# Patient Record
Sex: FEMALE | Race: WHITE | Employment: UNEMPLOYED | URBAN - METROPOLITAN AREA
[De-identification: names, ages, dates, MRNs, and addresses within clinical notes are randomized per-mention and may not be internally consistent; named-entity substitution may affect disease eponyms.]

---

## 2017-08-05 ENCOUNTER — ANESTHESIA EVENT (OUTPATIENT)
Dept: ENDOSCOPY | Age: 57
End: 2017-08-05
Payer: COMMERCIAL

## 2017-08-05 RX ORDER — SODIUM CHLORIDE, SODIUM LACTATE, POTASSIUM CHLORIDE, CALCIUM CHLORIDE 600; 310; 30; 20 MG/100ML; MG/100ML; MG/100ML; MG/100ML
100 INJECTION, SOLUTION INTRAVENOUS CONTINUOUS
Status: CANCELLED | OUTPATIENT
Start: 2017-08-05

## 2017-08-05 RX ORDER — SODIUM CHLORIDE 0.9 % (FLUSH) 0.9 %
5-10 SYRINGE (ML) INJECTION AS NEEDED
Status: CANCELLED | OUTPATIENT
Start: 2017-08-05

## 2017-08-07 ENCOUNTER — HOSPITAL ENCOUNTER (OUTPATIENT)
Age: 57
Setting detail: OUTPATIENT SURGERY
Discharge: HOME | End: 2017-08-07
Attending: INTERNAL MEDICINE | Admitting: INTERNAL MEDICINE
Payer: COMMERCIAL

## 2017-08-07 ENCOUNTER — ANESTHESIA (OUTPATIENT)
Dept: ENDOSCOPY | Age: 57
End: 2017-08-07
Payer: COMMERCIAL

## 2017-08-07 VITALS
HEIGHT: 67 IN | DIASTOLIC BLOOD PRESSURE: 77 MMHG | BODY MASS INDEX: 45.99 KG/M2 | OXYGEN SATURATION: 99 % | RESPIRATION RATE: 16 BRPM | SYSTOLIC BLOOD PRESSURE: 174 MMHG | WEIGHT: 293 LBS | HEART RATE: 59 BPM | TEMPERATURE: 98.6 F

## 2017-08-07 PROCEDURE — 88305 TISSUE EXAM BY PATHOLOGIST: CPT | Performed by: INTERNAL MEDICINE

## 2017-08-07 PROCEDURE — 77030013991 HC SNR POLYP ENDOSC BSC -A: Performed by: INTERNAL MEDICINE

## 2017-08-07 PROCEDURE — 76060000032 HC ANESTHESIA 0.5 TO 1 HR: Performed by: INTERNAL MEDICINE

## 2017-08-07 PROCEDURE — 74011250636 HC RX REV CODE- 250/636: Performed by: ANESTHESIOLOGY

## 2017-08-07 PROCEDURE — 74011000250 HC RX REV CODE- 250

## 2017-08-07 PROCEDURE — 76040000025: Performed by: INTERNAL MEDICINE

## 2017-08-07 PROCEDURE — 74011250636 HC RX REV CODE- 250/636

## 2017-08-07 PROCEDURE — 77030009426 HC FCPS BIOP ENDOSC BSC -B: Performed by: INTERNAL MEDICINE

## 2017-08-07 PROCEDURE — 77030011640 HC PAD GRND REM COVD -A: Performed by: INTERNAL MEDICINE

## 2017-08-07 RX ORDER — PROPOFOL 10 MG/ML
INJECTION, EMULSION INTRAVENOUS AS NEEDED
Status: DISCONTINUED | OUTPATIENT
Start: 2017-08-07 | End: 2017-08-07 | Stop reason: HOSPADM

## 2017-08-07 RX ORDER — LIDOCAINE HYDROCHLORIDE 20 MG/ML
INJECTION, SOLUTION EPIDURAL; INFILTRATION; INTRACAUDAL; PERINEURAL AS NEEDED
Status: DISCONTINUED | OUTPATIENT
Start: 2017-08-07 | End: 2017-08-07 | Stop reason: HOSPADM

## 2017-08-07 RX ORDER — SODIUM CHLORIDE, SODIUM LACTATE, POTASSIUM CHLORIDE, CALCIUM CHLORIDE 600; 310; 30; 20 MG/100ML; MG/100ML; MG/100ML; MG/100ML
100 INJECTION, SOLUTION INTRAVENOUS CONTINUOUS
Status: DISCONTINUED | OUTPATIENT
Start: 2017-08-07 | End: 2017-08-07 | Stop reason: HOSPADM

## 2017-08-07 RX ORDER — PROPOFOL 10 MG/ML
INJECTION, EMULSION INTRAVENOUS
Status: DISCONTINUED | OUTPATIENT
Start: 2017-08-07 | End: 2017-08-07 | Stop reason: HOSPADM

## 2017-08-07 RX ADMIN — PROPOFOL 180 MCG/KG/MIN: 10 INJECTION, EMULSION INTRAVENOUS at 08:21

## 2017-08-07 RX ADMIN — PROPOFOL 30 MG: 10 INJECTION, EMULSION INTRAVENOUS at 08:21

## 2017-08-07 RX ADMIN — SODIUM CHLORIDE, SODIUM LACTATE, POTASSIUM CHLORIDE, AND CALCIUM CHLORIDE 100 ML/HR: 600; 310; 30; 20 INJECTION, SOLUTION INTRAVENOUS at 07:28

## 2017-08-07 RX ADMIN — LIDOCAINE HYDROCHLORIDE 40 MG: 20 INJECTION, SOLUTION EPIDURAL; INFILTRATION; INTRACAUDAL; PERINEURAL at 08:21

## 2017-08-07 NOTE — DISCHARGE INSTRUCTIONS
Gastrointestinal Esophagogastroduodenoscopy (EGD) - Upper Exam Discharge Instructions    1. Call Dr. Evette Pavon at [phone] for any problems or questions. 2. Contact the doctor's office for follow up appointment as directed. 3. Medication may cause drowsiness for several hours, therefore, do not drive or operate machinery for remainder of the day. 4. No alcohol today. 5. Ordinarily, you may resume regular diet and activity after exam unless otherwise specified by your physician. 6. For mild soreness in your throat you may use Cepacol throat lozenges or warm salt-water gargles as needed. Any additional instructions:   dietary measures to reduce GERD symptoms. Recommend gradual weight loss. Continue omeprazole  Avoid NSAIDs       Instructions given to Misti Lares and other family members. Instructions given by:  Yefri Lee RN    Gastrointestinal Colonoscopy/Flexible Sigmoidoscopy - Lower Exam Discharge Instructions  1. Call Dr. Evette Pavon at [phone] for any problems or questions. 2. Contact the doctors office for follow up appointment as directed  3. Medication may cause drowsiness for several hours, therefore, do not drive or operate machinery for remainder of the day. 4. No alcohol today. 5. Ordinarily, you may resume regular diet and activity after exam unless otherwise specified by your physician. 6. Because of air put into your colon during exam, you may experience some abdominal distension, relieved by the passage of gas, for several hours. 7. Contact your physician if you have any of the following:  a. Excessive amount of bleeding - large amount when having a bowel movement. Occasional specks of blood with bowel movement would not be unusual.  b. Severe abdominal pain  c. Fever or Chills  8. Polyp Removal - follow these additional instructions  a. Resume regular diet   b. Take Metamucil - 1 tablespoon in juice every morning for 3 days  c.  No Aspirin, Advil, Aleve, Nuprin, Ibuprofen, or medications that contain these drugs for 2 weeks. Any additional instructions: Follow-up pathology results. High fiber diet for bowel regularity. Daily fiber supplement if needed. No NSAIDs for 2 weeks  Continue Colestid for diarrhea- likely related to prior cholecystectomy. Increase amount as needed  Followup in the office with Dr. Robert Escalera    Instructions given to Leopoldo Alexa and other family members.   Instructions given by:  Kang Patino RN

## 2017-08-07 NOTE — PROGRESS NOTES
Discharge instructions and discharge med list given to pt's  Todd, voiced understanding. Pt's  given print out of high fiber and GERD diets,voiced understanding.

## 2017-08-07 NOTE — PROCEDURES
Colonoscopy Procedure Note    PreOp Diagnosis:   Colon cancer screening  Diarrhea    PostOp Diagnosis:  Colon polyp  Diverticulosis    Medications:  Monitored Anesthesia    Procedure:  Colonoscopy  Instrument:   AL  After informed consent was obtained, the patient was sedated and the colonoscope was inserted  in the anus and advanced into the cecum without difficulty. The scope was slowly withdrawn while the mucosa was carefully inspected, including a retroflexed view of the rectum. Prep: Excellent    Findings:   Ileum: Normal  Colon: Moderate diverticulosis in the sigmoid and descending colon. 3 cm pedunculated polyp in the sigmoid colon, hot snare. No evidence of colitis or inflammation. Random biopsies were obtained throughout the right and left colon to rule out microscopic colitis. Rectum: Normal    Recommendations: Follow-up pathology results. High fiber diet for bowel regularity. Daily fiber supplement if needed. No NSAIDs for 2 weeks  Continue Colestid for diarrhea- likely related to prior cholecystectomy.   Increase amount as needed  Followup in the office with Dr. Becky Corcoran MD

## 2017-08-07 NOTE — ANESTHESIA PREPROCEDURE EVALUATION
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)    Exercise tolerance: >4 METS     GI/Hepatic/Renal     GERD: well controlled    Renal disease: CRI       Endo/Other      Hypothyroidism  Morbid obesity and arthritis     Other Findings   Comments: bipolar         Physical Exam    Airway  Mallampati: II  TM Distance: 4 - 6 cm  Neck ROM: normal range of motion   Mouth opening: Normal     Cardiovascular  Regular rate and rhythm,  S1 and S2 normal,  no murmur, click, rub, or gallop  Rhythm: regular  Rate: normal         Dental  No notable dental hx       Pulmonary  Breath sounds clear to auscultation               Abdominal  GI exam deferred       Other Findings            Anesthetic Plan    ASA: 3  Anesthesia type: total IV anesthesia          Induction: Intravenous  Anesthetic plan and risks discussed with: Patient

## 2017-08-07 NOTE — IP AVS SNAPSHOT
Brooklynn Santos 
 
 
 [address] [facility] [address] [facility] 
[phone] Patient: Alec Espinosa MRN: [id number] ENOC:[id number] You are allergic to the following Allergen Reactions Nsaids (Non-Steroidal Anti-Inflammatory Drug) Other (comments) \"kidney failure--stage 3\" Recent Documentation Height Weight Breastfeeding? BMI OB Status Smoking Status 1.702 m (!) 167.4 kg No 57.79 kg/m2 Hysterectomy Never Smoker Emergency Contacts Name Discharge Info Relation Home Work Mobile Lucianne Goltz  Spouse [3] [phone] About your hospitalization You were admitted on:  August 7, 2017 You last received care in the:  [facility] You were discharged on:  August 7, 2017 Unit phone number:  [phone] Why you were hospitalized Your primary diagnosis was:  Not on File Providers Seen During Your Hospitalizations Provider Role Specialty Primary office phone Yunier Ortiz MD Attending Provider Gastroenterology [phone] Your Primary Care Physician (PCP) Primary Care Physician Office Phone Office Fax [address] [phone] Follow-up Information None Current Discharge Medication List  
  
ASK your doctor about these medications Dose & Instructions Dispensing Information Comments Morning Noon Evening Bedtime  
 levothyroxine 25 mcg tablet Commonly known as:  SYNTHROID Your last dose was: Your next dose is:    
   
   
 Dose:  25 mcg Take 25 mcg by mouth Daily (before breakfast). \"out of med since mid July 2017\" Refills:  0  
     
   
   
   
  
 omeprazole 40 mg capsule Commonly known as:  PRILOSEC Your last dose was: Your next dose is:    
   
   
 Dose:  40 mg Take 40 mg by mouth two (2) times a day. Refills:  0 Discharge Instructions Gastrointestinal Esophagogastroduodenoscopy (EGD) - Upper Exam Discharge Instructions 1. Call Dr. Josué Belcher at [phone] for any problems or questions. 2. Contact the doctor's office for follow up appointment as directed. 3. Medication may cause drowsiness for several hours, therefore, do not drive or operate machinery for remainder of the day. 4. No alcohol today. 5. Ordinarily, you may resume regular diet and activity after exam unless otherwise specified by your physician. 6. For mild soreness in your throat you may use Cepacol throat lozenges or warm salt-water gargles as needed. Any additional instructions:  
dietary measures to reduce GERD symptoms. Recommend gradual weight loss. Continue omeprazole Avoid NSAIDs Instructions given to Batool Winters and other family members. Instructions given by:  Devendra Maloney RN Gastrointestinal Colonoscopy/Flexible Sigmoidoscopy - Lower Exam Discharge Instructions 1. Call Dr. Josué Belcher at [phone] for any problems or questions. 2. Contact the doctors office for follow up appointment as directed 3. Medication may cause drowsiness for several hours, therefore, do not drive or operate machinery for remainder of the day. 4. No alcohol today. 5. Ordinarily, you may resume regular diet and activity after exam unless otherwise specified by your physician. 6. Because of air put into your colon during exam, you may experience some abdominal distension, relieved by the passage of gas, for several hours. 7. Contact your physician if you have any of the following: 
a. Excessive amount of bleeding  large amount when having a bowel movement. Occasional specks of blood with bowel movement would not be unusual. 
b. Severe abdominal pain 
c. Fever or Chills 8. Polyp Removal  follow these additional instructions 
a. Resume regular diet  
b. Take Metamucil  1 tablespoon in juice every morning for 3 days c. No Aspirin, Advil, Aleve, Nuprin, Ibuprofen, or medications that contain these drugs for 2 weeks. Any additional instructions: Follow-up pathology results. High fiber diet for bowel regularity. Daily fiber supplement if needed. No NSAIDs for 2 weeks Continue Colestid for diarrhea- likely related to prior cholecystectomy. Increase amount as needed Followup in the office with Dr. Tata Pollock Instructions given to Marco Antonio Mahmood and other family members. Instructions given by:  Ami Martin RN Discharge Orders None Introducing [facility]! [facility] introduces [employer] patient portal. Now you can access parts of your medical record, email your doctor's office, and request medication refills online. 1. In your internet browser, go to [email or web address] 2. Click on the First Time User? Click Here link in the Sign In box. You will see the New Member Sign Up page. 3. Enter your [employer] Access Code exactly as it appears below. You will not need to use this code after youve completed the sign-up process. If you do not sign up before the expiration date, you must request a new code. · [employer] Access Code: [facility] Expires: 11/5/2017  6:22 AM 
 
4. Enter the last four digits of your Social Security Number (xxxx) and Date of Birth (mm/dd/yyyy) as indicated and click Submit. You will be taken to the next sign-up page. 5. Create a [employer] ID. This will be your [employer] login ID and cannot be changed, so think of one that is secure and easy to remember. 6. Create a [employer] password. You can change your password at any time. 7. Enter your Password Reset Question and Answer. This can be used at a later time if you forget your password. 8. Enter your e-mail address. You will receive e-mail notification when new information is available in [address] 9. Click Sign Up. You can now view and download portions of your medical record. 10. Click the Download Summary menu link to download a portable copy of your medical information. If you have questions, please visit the Frequently Asked Questions section of the [employer] website. Remember, MyChart is NOT to be used for urgent needs. For medical emergencies, dial 911. Now available from your iPhone and Android! General Information Please provide this summary of care documentation to your next provider. Patient Signature:  ____________________________________________________________ Date:  ____________________________________________________________  
  
Beto Endo Provider Signature:  ____________________________________________________________ Date:  ____________________________________________________________

## 2017-08-07 NOTE — H&P
Gastroenterology Outpatient History and Physical    Patient: Vidhya Costello    Physician: Con Rogel MD    Vital Signs: Blood pressure 134/63, pulse 65, temperature 97.9 °F (36.6 °C), resp. rate 14, height 5' 7\" (1.702 m), weight (!) 167.4 kg (369 lb), SpO2 99 %, not currently breastfeeding. Allergies: Allergies   Allergen Reactions    Nsaids (Non-Steroidal Anti-Inflammatory Drug) Other (comments)     \"kidney failure--stage 3\"       Chief Complaint: GERD    History of Present Illness: Colon Ca Screen    Justification for Procedure: As above    History:  Past Medical History:   Diagnosis Date    Adverse effect of anesthesia 2014    \"IV came out--awake during splenic artery repair\"    Aneurysm ([facility] [address]     \"splenic artery\"--surgery 2014    Arthritis     knees    Chronic kidney disease dx 2007    Stage 3--followed by Dr. Yue Bolivar GERD (gastroesophageal reflux disease)     controlled with omeprazole twice daily    Ill-defined condition     patient states diagnosed as \"free bleeder\"as a child. States Leiden Factor ? --Saw hematologist 2013    Morbid obesity ([address]     BMI 57.8 (8/3/17)    Nausea & vomiting     after knee scope only (2008)    Psychiatric disorder     bipolar dx 2005. No medications. States was misdiagnosed during extreme emotional distress-situational    Thyroid disease     hypothyroid, not taking med. since approximately mid july 2017    Unspecified adverse effect of anesthesia     chest pressure after knee scope -2008    Unspecified sleep apnea     reports snoring. Negative sleep study approx. 2012 per pt.       Past Surgical History:   Procedure Laterality Date    CARDIAC SURG PROCEDURE UNLIST  2003    HX CHOLECYSTECTOMY  2002    HX HYSTERECTOMY  2002    HX KNEE ARTHROSCOPY  2008    HX OTHER SURGICAL  2014    splenic artery aneurysm      Social History     Social History    Marital status:      Spouse name: N/A    Number of children: N/A    Years of education: N/A     Social History Main Topics    Smoking status: Never Smoker    Smokeless tobacco: Never Used    Alcohol use No    Drug use: No    Sexual activity: Not Asked     Other Topics Concern    None     Social History Narrative      Family History   Problem Relation Age of Onset    Cancer Mother     Diabetes Father        Medications:   Prior to Admission medications    Medication Sig Start Date End Date Taking? Authorizing Provider   omeprazole (PRILOSEC) 40 mg capsule Take 40 mg by mouth two (2) times a day. Yes Historical Provider   levothyroxine (SYNTHROID) 25 mcg tablet Take 25 mcg by mouth Daily (before breakfast).  \"out of med since mid July 2017\"   Yes Historical Provider       Physical Exam:   General: alert      Heart: regular rate and rhythm   Lungs: no tachypnea, retractions or cyanosis, Heart exam - S1, S2 normal, no murmur, no gallop, rate regular   Abdominal: Bowel sounds are normal, soft, non distended             Plan of Care/Planned Procedure: EGD/[place]    Signed:  Ike Liu MD 8/7/2017

## 2017-08-07 NOTE — ANESTHESIA POSTPROCEDURE EVALUATION
Post-Anesthesia Evaluation and Assessment    Patient: Cuca Emerson MRN: [id number]  SSN: [id number]    YOB: 1960  Age: 61 y.o. Sex: female       Cardiovascular Function/Vital Signs  Visit Vitals    /72    Pulse 78    Temp 37 °C (98.6 °F)    Resp 16    Ht 5' 7\" (1.702 m)    Wt (!) 167.4 kg (369 lb)    SpO2 100%    Breastfeeding No    BMI 57.79 kg/m2       Patient is status post total IV anesthesia anesthesia for Procedure(s):  COLONOSCOPY / BMI=57  ESOPHAGOGASTRODUODENOSCOPY (EGD)  COLON BIOPSY  ENDOSCOPIC POLYPECTOMY. Nausea/Vomiting: None    Postoperative hydration reviewed and adequate. Pain:  Pain Scale 1: Numeric (0 - 10) (08/07/17 0857)  Pain Intensity 1: 0 (08/07/17 0857)   Managed    Neurological Status: At baseline    Mental Status and Level of Consciousness: Arousable    Pulmonary Status:   O2 Device: Oxygen mask (08/07/17 0857)   Adequate oxygenation and airway patent    Complications related to anesthesia: None    Post-anesthesia assessment completed.  No concerns    Signed By: Davi Fry MD     August 7, 2017

## 2017-08-07 NOTE — PROCEDURES
EGD Procedure Note    PreOp Diagnosis:   GERD  Atypical CP  Abnormal UGI series    PostOp Diagnosis:  Normal EGD    Medications:  Monitored Anesthesia    Procedure:  EGD   Instrument:   After informed consent was obtained, the patient was sedated and the endoscope was inserted  in the mouth and advanced into the duodenum without difficulty. The scope was slowly withdrawn while the mucosa was carefully inspected, including a retroflexed view of the cardia and fundus. Findings:   Esophagus: The proximal and mid esophagus appeared normal.  In the distal esophagus, The Z line was normal.  There was no esophagitis or Del Valle's mucosa. Stomach: Normal, without ulcers, erosions, or polyps. No significant hiatal hernia. Duodenum:   Normal Without ulcers or inflammation    Recommendations:  Information will be provided on dietary measures to reduce GERD symptoms. Recommend gradual weight loss.    Continue omeprazole  Avoid NSAIDs  Proceed with colonoscopy    Sukhjinder Howard MD

## 2017-11-27 ENCOUNTER — HOSPITAL ENCOUNTER (OUTPATIENT)
Dept: ULTRASOUND IMAGING | Age: 57
Discharge: HOME | End: 2017-11-27
Payer: COMMERCIAL

## 2017-11-27 DIAGNOSIS — R16.0 HEPATOMEGALY: ICD-10-CM

## 2017-11-27 PROCEDURE — 76700 US EXAM ABDOM COMPLETE: CPT

## 2024-07-23 ENCOUNTER — OFFICE VISIT (OUTPATIENT)
Dept: ORTHOPEDIC SURGERY | Age: 64
End: 2024-07-23
Payer: COMMERCIAL

## 2024-07-23 VITALS — HEIGHT: 66 IN | WEIGHT: 293 LBS | BODY MASS INDEX: 47.09 KG/M2

## 2024-07-23 DIAGNOSIS — M17.11 OSTEOARTHRITIS OF RIGHT KNEE, UNSPECIFIED OSTEOARTHRITIS TYPE: ICD-10-CM

## 2024-07-23 DIAGNOSIS — M17.12 OSTEOARTHRITIS OF LEFT KNEE, UNSPECIFIED OSTEOARTHRITIS TYPE: ICD-10-CM

## 2024-07-23 DIAGNOSIS — M25.562 PAIN IN BOTH KNEES, UNSPECIFIED CHRONICITY: Primary | ICD-10-CM

## 2024-07-23 DIAGNOSIS — M25.561 PAIN IN BOTH KNEES, UNSPECIFIED CHRONICITY: Primary | ICD-10-CM

## 2024-07-23 PROCEDURE — G8417 CALC BMI ABV UP PARAM F/U: HCPCS | Performed by: PHYSICIAN ASSISTANT

## 2024-07-23 PROCEDURE — 20611 DRAIN/INJ JOINT/BURSA W/US: CPT | Performed by: PHYSICIAN ASSISTANT

## 2024-07-23 PROCEDURE — 4004F PT TOBACCO SCREEN RCVD TLK: CPT | Performed by: PHYSICIAN ASSISTANT

## 2024-07-23 PROCEDURE — G8400 PT W/DXA NO RESULTS DOC: HCPCS | Performed by: PHYSICIAN ASSISTANT

## 2024-07-23 PROCEDURE — 1123F ACP DISCUSS/DSCN MKR DOCD: CPT | Performed by: PHYSICIAN ASSISTANT

## 2024-07-23 PROCEDURE — 99204 OFFICE O/P NEW MOD 45 MIN: CPT | Performed by: PHYSICIAN ASSISTANT

## 2024-07-23 PROCEDURE — 1090F PRES/ABSN URINE INCON ASSESS: CPT | Performed by: PHYSICIAN ASSISTANT

## 2024-07-23 PROCEDURE — 3017F COLORECTAL CA SCREEN DOC REV: CPT | Performed by: PHYSICIAN ASSISTANT

## 2024-07-23 PROCEDURE — G8428 CUR MEDS NOT DOCUMENT: HCPCS | Performed by: PHYSICIAN ASSISTANT

## 2024-07-23 RX ORDER — TRIAMCINOLONE ACETONIDE 40 MG/ML
40 INJECTION, SUSPENSION INTRA-ARTICULAR; INTRAMUSCULAR ONCE
Status: COMPLETED | OUTPATIENT
Start: 2024-07-23 | End: 2024-07-23

## 2024-07-23 RX ADMIN — TRIAMCINOLONE ACETONIDE 40 MG: 40 INJECTION, SUSPENSION INTRA-ARTICULAR; INTRAMUSCULAR at 09:23

## 2024-07-23 NOTE — PROGRESS NOTES
Name: Tiffanie Ly Adcox  YOB: 1960  Gender: female  MRN: [id number]    CC:   Chief Complaint   Patient presents with    Knee Pain     Bilateral knee-getting xrays today         DIAGNOSIS:   Encounter Diagnoses   Name Primary?    Pain in both knees, unspecified chronicity Yes    Osteoarthritis of right knee, unspecified osteoarthritis type     Osteoarthritis of left knee, unspecified osteoarthritis type         HPI:   The bilateral knee pain has been present for years and is becoming worse, L>R.  It hurts at night when sleeping.  The pain is located over the knees.    It does hurt to walk and gets worse with increased distances.   The pain does not radiate down the leg.  Numbness and tingling are not noted.   Treatment so far has been IA cortisone and HP in the past with the latter providing longer lasting relief.  Cannot take NSAIDs due to h/o CHF and CKD.        Current Outpatient Medications:     levothyroxine (SYNTHROID) 25 MCG tablet, Take 25 mcg by mouth every morning (before breakfast), Disp: , Rfl:     omeprazole (PRILOSEC) 40 MG delayed release capsule, Take 40 mg by mouth 2 times daily, Disp: , Rfl:   Not on File  Past Medical History:   Diagnosis Date    Adverse effect of anesthesia 2014    \"IV came out--awake during splenic artery repair\"    Aneurysm ([facility])     \"splenic artery\"--surgery 2014    Arthritis     knees    Chronic kidney disease dx 2007    Stage 3--followed by Dr. Barrera    GERD (gastroesophageal reflux disease)     controlled with omeprazole twice daily    Ill-defined condition     patient states diagnosed as \"free bleeder\"as a child.  States Leiden Factor ?--Saw hematologist 2013    Morbid obesity (HCC)     BMI 57.8 (8/3/17)    Nausea & vomiting     after knee scope only (2008)    Psychiatric disorder     bipolar dx 2005.  No medications.  States was misdiagnosed during extreme emotional distress-situational    Thyroid disease     hypothyroid, not taking med. since

## 2024-08-21 ENCOUNTER — OFFICE VISIT (OUTPATIENT)
Dept: ORTHOPEDIC SURGERY | Age: 64
End: 2024-08-21
Payer: COMMERCIAL

## 2024-08-21 DIAGNOSIS — M17.11 OSTEOARTHRITIS OF RIGHT KNEE, UNSPECIFIED OSTEOARTHRITIS TYPE: Primary | ICD-10-CM

## 2024-08-21 DIAGNOSIS — M17.12 OSTEOARTHRITIS OF LEFT KNEE, UNSPECIFIED OSTEOARTHRITIS TYPE: ICD-10-CM

## 2024-08-21 PROCEDURE — 20611 DRAIN/INJ JOINT/BURSA W/US: CPT | Performed by: ORTHOPAEDIC SURGERY

## 2024-08-21 RX ORDER — HYALURONATE SODIUM 10 MG/ML
20 SYRINGE (ML) INTRAARTICULAR ONCE
Status: COMPLETED | OUTPATIENT
Start: 2024-08-21 | End: 2024-08-21

## 2024-08-21 RX ADMIN — Medication 20 MG: at 13:46

## 2024-08-21 RX ADMIN — Medication 20 MG: at 13:47

## 2024-08-21 NOTE — PROGRESS NOTES
Name: Tiffanie Ly Adcox  YOB: 1960  Gender: female  MRN: [id number]        CC: Bilateral Knee Pain   she continues with weight loss.  She is lost 80 pounds and desires to lose perhaps another 80.  She is happy with this approach.  She is aware the left knee arthroplasty is for the right but both knees she thinks will and with replacement arthroplasty    PROCEDURE:  1  of 3 bilateral knee HP    Diagnosis:   Encounter Diagnoses   Name Primary?    Osteoarthritis of right knee, unspecified osteoarthritis type Yes    Osteoarthritis of left knee, unspecified osteoarthritis type           [facility] with variable frequency (6.0-15.0 MHz) linear transducer was used to visualize the intracondylar notch, retropatellar fat pad, patella tendon, patella, tibia, and to ensure proper intra-articular needle placement.  Injection image was saved in patient's permanent chart.    Procedure Note: Time out was performed which included identifying the patient by name and date of birth.  The procedure site was identified with all present in agreement.  The patient was placed in upright position with both knees hanging freely from exam table.  The knees were prepped in sterile fashion using alcohol wipe.  Using the [employer] ultrasound guidance, a 22 gauge needle was then introduced into both knee joints from an infrapatellar approach and 2 mL of Euflexxa was injected freely into each knee.  The needle was then removed, pressure hemostasis achieved, injection sites were cleansed with alcohol wipe, and dressed with band aid.    The patient tolerated the procedure without complication.  The patient will follow up as scheduled.

## 2024-08-28 ENCOUNTER — OFFICE VISIT (OUTPATIENT)
Dept: ORTHOPEDIC SURGERY | Age: 64
End: 2024-08-28
Payer: COMMERCIAL

## 2024-08-28 DIAGNOSIS — M17.12 OSTEOARTHRITIS OF LEFT KNEE, UNSPECIFIED OSTEOARTHRITIS TYPE: ICD-10-CM

## 2024-08-28 DIAGNOSIS — M17.11 OSTEOARTHRITIS OF RIGHT KNEE, UNSPECIFIED OSTEOARTHRITIS TYPE: Primary | ICD-10-CM

## 2024-08-28 PROCEDURE — 20611 DRAIN/INJ JOINT/BURSA W/US: CPT | Performed by: ORTHOPAEDIC SURGERY

## 2024-08-28 RX ORDER — HYALURONATE SODIUM 10 MG/ML
20 SYRINGE (ML) INTRAARTICULAR ONCE
Status: COMPLETED | OUTPATIENT
Start: 2024-08-28 | End: 2024-08-28

## 2024-08-28 RX ADMIN — Medication 20 MG: at 13:49

## 2024-08-28 NOTE — PROGRESS NOTES
Name: Tiffanie Ly Adcox  YOB: 1960  Gender: female  MRN: [id number]        CC: Bilateral Knee Pain     PROCEDURE:  2  of 3 bilateral knee HP    Diagnosis:   Encounter Diagnoses   Name Primary?    Osteoarthritis of right knee, unspecified osteoarthritis type Yes    Osteoarthritis of left knee, unspecified osteoarthritis type           [facility] US unit with variable frequency (6.0-15.0 MHz) linear transducer was used to visualize the intracondylar notch, retropatellar fat pad, patella tendon, patella, tibia, and to ensure proper intra-articular needle placement.  Injection image was saved in patient's permanent chart.    Procedure Note: Time out was performed which included identifying the patient by name and date of birth.  The procedure site was identified with all present in agreement.  The patient was placed in upright position with both knees hanging freely from exam table.  The knees were prepped in sterile fashion using alcohol wipe.  Using the [employer] ultrasound guidance, a 22 gauge needle was then introduced into both knee joints from an infrapatellar approach and 2 mL of Euflexxa was injected freely into each knee.  The needle was then removed, pressure hemostasis achieved, injection sites were cleansed with alcohol wipe, and dressed with band aid.    The patient tolerated the procedure without complication.  The patient will follow up as scheduled.

## 2024-09-11 ENCOUNTER — OFFICE VISIT (OUTPATIENT)
Dept: ORTHOPEDIC SURGERY | Age: 64
End: 2024-09-11
Payer: COMMERCIAL

## 2024-09-11 DIAGNOSIS — M17.11 OSTEOARTHRITIS OF RIGHT KNEE, UNSPECIFIED OSTEOARTHRITIS TYPE: Primary | ICD-10-CM

## 2024-09-11 DIAGNOSIS — M17.12 OSTEOARTHRITIS OF LEFT KNEE, UNSPECIFIED OSTEOARTHRITIS TYPE: ICD-10-CM

## 2024-09-11 PROCEDURE — 20611 DRAIN/INJ JOINT/BURSA W/US: CPT | Performed by: ORTHOPAEDIC SURGERY

## 2024-09-11 RX ORDER — HYALURONATE SODIUM 10 MG/ML
20 SYRINGE (ML) INTRAARTICULAR ONCE
Status: COMPLETED | OUTPATIENT
Start: 2024-09-11 | End: 2024-09-11

## 2024-09-11 RX ADMIN — Medication 20 MG: at 11:01

## (undated) DEVICE — REM POLYHESIVE ADULT PATIENT RETURN ELECTRODE: Brand: VALLEYLAB

## (undated) DEVICE — FORCEPS BX L240CM JAW DIA2.8MM L CAP W/ NDL MIC MESH TOOTH

## (undated) DEVICE — CONNECTOR TBNG OD5-7MM O2 END DISP

## (undated) DEVICE — SYR 5ML 1/5 GRAD LL NSAF LF --

## (undated) DEVICE — SYR 3ML LL TIP 1/10ML GRAD --

## (undated) DEVICE — KENDALL RADIOLUCENT FOAM MONITORING ELECTRODE RECTANGULAR SHAPE: Brand: KENDALL

## (undated) DEVICE — NDL PRT INJ NSAF BLNT 18GX1.5 --

## (undated) DEVICE — CANNULA NSL ORAL AD FOR CAPNOFLEX CO2 O2 AIRLFE

## (undated) DEVICE — BLOCK BITE AD 60FR W/ VELC STRP ADDRESSES MOST PT AND

## (undated) DEVICE — SNARE POLYP SM W13MMXL240CM SHTH DIA2.4MM OVL FLX DISP

## (undated) DEVICE — CONTAINER PREFIL FRMLN 40ML --